# Patient Record
Sex: MALE | Race: WHITE | NOT HISPANIC OR LATINO | Employment: UNEMPLOYED | URBAN - NONMETROPOLITAN AREA
[De-identification: names, ages, dates, MRNs, and addresses within clinical notes are randomized per-mention and may not be internally consistent; named-entity substitution may affect disease eponyms.]

---

## 2024-04-19 ENCOUNTER — LAB REQUISITION (OUTPATIENT)
Dept: LAB | Facility: HOSPITAL | Age: 34
End: 2024-04-19
Payer: MEDICARE

## 2024-04-19 DIAGNOSIS — N31.9 NEUROMUSCULAR DYSFUNCTION OF BLADDER, UNSPECIFIED: ICD-10-CM

## 2024-04-19 PROCEDURE — 87077 CULTURE AEROBIC IDENTIFY: CPT

## 2024-04-19 PROCEDURE — 87086 URINE CULTURE/COLONY COUNT: CPT

## 2024-04-19 PROCEDURE — 87186 SC STD MICRODIL/AGAR DIL: CPT

## 2024-04-21 LAB — BACTERIA SPEC AEROBE CULT: ABNORMAL

## 2024-07-03 ENCOUNTER — TRANSCRIBE ORDERS (OUTPATIENT)
Dept: LAB | Facility: HOSPITAL | Age: 34
End: 2024-07-03
Payer: MEDICARE

## 2024-07-03 DIAGNOSIS — R10.9 STOMACH ACHE: Primary | ICD-10-CM

## 2024-07-03 DIAGNOSIS — R10.9 ABDOMINAL PAIN, UNSPECIFIED ABDOMINAL LOCATION: ICD-10-CM

## 2024-07-09 PROCEDURE — 87186 SC STD MICRODIL/AGAR DIL: CPT | Performed by: UROLOGY

## 2024-07-09 PROCEDURE — 87086 URINE CULTURE/COLONY COUNT: CPT | Performed by: UROLOGY

## 2024-07-09 PROCEDURE — 87077 CULTURE AEROBIC IDENTIFY: CPT | Performed by: UROLOGY

## 2024-07-11 LAB — BACTERIA SPEC AEROBE CULT: ABNORMAL

## 2024-08-06 ENCOUNTER — LAB REQUISITION (OUTPATIENT)
Dept: LAB | Facility: HOSPITAL | Age: 34
End: 2024-08-06
Payer: MEDICARE

## 2024-08-06 DIAGNOSIS — N30.00 ACUTE CYSTITIS WITHOUT HEMATURIA: ICD-10-CM

## 2024-08-06 LAB
BACTERIA UR QL AUTO: ABNORMAL /HPF
BILIRUB UR QL STRIP: NEGATIVE
CLARITY UR: CLEAR
COLOR UR: YELLOW
GLUCOSE UR STRIP-MCNC: NEGATIVE MG/DL
HGB UR QL STRIP.AUTO: ABNORMAL
HYALINE CASTS UR QL AUTO: ABNORMAL /LPF
KETONES UR QL STRIP: ABNORMAL
LEUKOCYTE ESTERASE UR QL STRIP.AUTO: ABNORMAL
NITRITE UR QL STRIP: NEGATIVE
PH UR STRIP.AUTO: 6 [PH] (ref 5–8)
PROT UR QL STRIP: ABNORMAL
RBC # UR STRIP: ABNORMAL /HPF
REF LAB TEST METHOD: ABNORMAL
SP GR UR STRIP: 1.02 (ref 1–1.03)
SQUAMOUS #/AREA URNS HPF: ABNORMAL /HPF
UROBILINOGEN UR QL STRIP: ABNORMAL
WBC # UR STRIP: ABNORMAL /HPF

## 2024-08-06 PROCEDURE — 87186 SC STD MICRODIL/AGAR DIL: CPT | Performed by: NURSE PRACTITIONER

## 2024-08-06 PROCEDURE — 87086 URINE CULTURE/COLONY COUNT: CPT | Performed by: NURSE PRACTITIONER

## 2024-08-06 PROCEDURE — 87077 CULTURE AEROBIC IDENTIFY: CPT | Performed by: NURSE PRACTITIONER

## 2024-08-06 PROCEDURE — 81001 URINALYSIS AUTO W/SCOPE: CPT | Performed by: NURSE PRACTITIONER

## 2024-08-06 PROCEDURE — 87106 FUNGI IDENTIFICATION YEAST: CPT | Performed by: NURSE PRACTITIONER

## 2024-08-08 LAB — BACTERIA SPEC AEROBE CULT: ABNORMAL

## 2024-08-15 LAB — YEAST SPEC QL CULT: NORMAL

## 2024-10-04 ENCOUNTER — TRANSCRIBE ORDERS (OUTPATIENT)
Dept: LAB | Facility: HOSPITAL | Age: 34
End: 2024-10-04
Payer: MEDICARE

## 2024-10-04 DIAGNOSIS — N39.0 RECURRENT URINARY TRACT INFECTION: Primary | ICD-10-CM

## 2024-10-06 PROCEDURE — 87077 CULTURE AEROBIC IDENTIFY: CPT | Performed by: NURSE PRACTITIONER

## 2024-10-06 PROCEDURE — 87086 URINE CULTURE/COLONY COUNT: CPT | Performed by: NURSE PRACTITIONER

## 2024-10-06 PROCEDURE — 87186 SC STD MICRODIL/AGAR DIL: CPT | Performed by: NURSE PRACTITIONER

## 2024-10-09 LAB
BACTERIA SPEC AEROBE CULT: ABNORMAL
BACTERIA SPEC AEROBE CULT: ABNORMAL

## 2024-11-20 ENCOUNTER — LAB REQUISITION (OUTPATIENT)
Dept: LAB | Facility: HOSPITAL | Age: 34
End: 2024-11-20
Payer: MEDICARE

## 2024-11-20 DIAGNOSIS — N39.0 URINARY TRACT INFECTION, SITE NOT SPECIFIED: ICD-10-CM

## 2024-11-20 PROCEDURE — 87186 SC STD MICRODIL/AGAR DIL: CPT | Performed by: UROLOGY

## 2024-11-20 PROCEDURE — 87077 CULTURE AEROBIC IDENTIFY: CPT | Performed by: UROLOGY

## 2024-11-20 PROCEDURE — 87086 URINE CULTURE/COLONY COUNT: CPT | Performed by: UROLOGY

## 2024-11-22 LAB — BACTERIA SPEC AEROBE CULT: ABNORMAL

## 2024-12-13 ENCOUNTER — OFFICE VISIT (OUTPATIENT)
Age: 34
End: 2024-12-13
Payer: MEDICARE

## 2024-12-13 VITALS
DIASTOLIC BLOOD PRESSURE: 65 MMHG | HEART RATE: 66 BPM | TEMPERATURE: 98.5 F | OXYGEN SATURATION: 97 % | SYSTOLIC BLOOD PRESSURE: 118 MMHG

## 2024-12-13 DIAGNOSIS — R53.83 OTHER FATIGUE: ICD-10-CM

## 2024-12-13 DIAGNOSIS — R10.9 ABDOMINAL PAIN, UNSPECIFIED ABDOMINAL LOCATION: Primary | ICD-10-CM

## 2024-12-13 PROBLEM — G82.50: Status: ACTIVE | Noted: 2024-12-13

## 2024-12-13 PROBLEM — N31.9 NEUROGENIC BLADDER: Status: ACTIVE | Noted: 2024-12-13

## 2024-12-13 PROBLEM — M43.22 CERVICAL VERTEBRAL FUSION: Status: ACTIVE | Noted: 2024-12-13

## 2024-12-13 PROCEDURE — 99204 OFFICE O/P NEW MOD 45 MIN: CPT | Performed by: FAMILY MEDICINE

## 2024-12-13 RX ORDER — SENNOSIDES A AND B 8.6 MG/1
17.2 TABLET, FILM COATED ORAL DAILY
COMMUNITY

## 2024-12-13 RX ORDER — LANOLIN ALCOHOL/MO/W.PET/CERES
200 CREAM (GRAM) TOPICAL DAILY
COMMUNITY

## 2024-12-13 NOTE — PROGRESS NOTES
New Patient Office Visit      Date: 2024  Patient Name: Mary Sommers  : 1990   MRN: [id number]     Chief Complaint:    Chief Complaint   Patient presents with    Establish Care       History of Present Illness: Mary Sommers is a 34 y.o. male who is here today for  establishment of care.  HAs been having problems with frequent UTIs.  Currently following with his urologist that is treating this  Wanting to look at getting a new power wheel chair.  Has been in current wheel chair since 2019. States that the current wheel chair has been starting to not work as well, parts have fallen off, parts have gotten loose, etc.  Asking about a new wheel chair.  States that he is also gaining weight so current wheel chair is getting a little bit small.  Left sided flank/abd pain.   Gastroetritis about 3 years ago.Abd pain is constant  Since that point in time.  Patient does state that his current power wheelchair is getting somewhat smaller as he has been gaining weight so leaning against the power wheelchair may be causing some of his issues.  Does however also state that he has some chronic bowel issues as well.  Does have history of what sounds to be likely sludge in his urine as well as he does do intermittent cath.    Subjective      Review of Systems:   Review of Systems   Constitutional:  Negative for appetite change and unexpected weight loss.   HENT:  Negative for trouble swallowing.    Eyes:  Negative for blurred vision and double vision.   Respiratory:  Negative for cough and shortness of breath.    Cardiovascular:  Negative for chest pain and leg swelling.   Gastrointestinal:  Negative for blood in stool.   Endocrine: Negative for cold intolerance, heat intolerance and polyuria.   Musculoskeletal:  Negative for joint swelling.   Skin:  Negative for color change and bruise.   Neurological:  Negative for numbness and memory problem.   Hematological:  Does not bruise/bleed easily.    Psychiatric/Behavioral:  Negative for suicidal ideas and depressed mood. The patient is not nervous/anxious.        Past Medical History: No past medical history on file.    Past Surgical History: No past surgical history on file.    Family History: No family history on file.    Social History:   Social History     Socioeconomic History    Marital status: Single   Tobacco Use    Smoking status: Never    Smokeless tobacco: Never   Vaping Use    Vaping status: Former       Medications:     Current Outpatient Medications:     CRANBERRY-VITAMIN C-VITAMIN E PO, Take  by mouth., Disp: , Rfl:     Magnesium Oxide -Mg Supplement 400 (240 Mg) MG tablet, Take 0.5 tablets by mouth Daily., Disp: , Rfl:     senna (SENOKOT) 8.6 MG tablet, Take 2 tablets by mouth Daily., Disp: , Rfl:     Allergies:   Allergies   Allergen Reactions    Codeine Dizziness, Other (See Comments) and Rash     vomiting   vomiting   vomiting   vomiting       Objective     Physical Exam:  Vital Signs:   Vitals:    12/13/24 1037   BP: 118/65   Pulse: 66   Temp: 98.5 °F (36.9 °C)   SpO2: 97%     There is no height or weight on file to calculate BMI.      Physical Exam  Vitals and nursing note reviewed.   Constitutional:       Comments: Patient is tetraplegic.  Currently using power wheelchair for mobilization.   HENT:      Head: Normocephalic and atraumatic.   Eyes:      General: Lids are normal.      Conjunctiva/sclera: Conjunctivae normal.   Cardiovascular:      Rate and Rhythm: Normal rate and regular rhythm.   Pulmonary:      Effort: Pulmonary effort is normal.      Breath sounds: Normal breath sounds and air entry.   Abdominal:      General: Abdomen is flat. Bowel sounds are normal.      Palpations: Abdomen is soft.   Musculoskeletal:      Cervical back: Full passive range of motion without pain and normal range of motion.   Neurological:      General: No focal deficit present.      Mental Status: He is alert and oriented to person, place, and time.    Psychiatric:         Attention and Perception: Attention normal.         Mood and Affect: Mood normal.         Behavior: Behavior normal. Behavior is cooperative.         POCT Results (if applicable):   Results for orders placed or performed in visit on 11/20/24   Urine Culture - Urine, Urine, Clean Catch    Collection Time: 11/20/24 11:00 AM    Specimen: Urine, Clean Catch   Result Value Ref Range    Urine Culture >100,000 CFU/mL Escherichia coli (A)        Susceptibility    Escherichia coli - QUAN     Amoxicillin + Clavulanate  Susceptible ug/ml     Ampicillin  Resistant ug/ml     Ampicillin + Sulbactam  Intermediate ug/ml     Cefazolin (Urine)  Susceptible ug/ml     Cefepime  Susceptible ug/ml     Ceftazidime  Susceptible ug/ml     Ceftriaxone  Susceptible ug/ml     Gentamicin  Susceptible ug/ml     Levofloxacin  Resistant ug/ml     Nitrofurantoin  Susceptible ug/ml     Piperacillin + Tazobactam  Susceptible ug/ml     Trimethoprim + Sulfamethoxazole  Resistant ug/ml            Assessment / Plan      Assessment/Plan:   Diagnoses and all orders for this visit:    1. Abdominal pain, unspecified abdominal location (Primary)  -     CBC (No Diff); Future  -     Comprehensive metabolic panel; Future  -     Hepatitis C antibody; Future  -     CT Abdomen Pelvis With & Without Contrast; Future    2. Other fatigue  -     Testosterone, Free, Total; Future  -     TSH; Future       BMI cannot be calculated due to outdated height or weight values.  Please input a current height/weight in Vitals and re-renter BMIFOLLOWUP in Note to pull in correct documentation based on BMI range.      Follow Up:   Return in about 3 months (around 3/13/2025) for Medicare Wellness.    Julien Castillo,    [facility]

## 2025-01-28 ENCOUNTER — LAB REQUISITION (OUTPATIENT)
Dept: LAB | Facility: HOSPITAL | Age: 35
End: 2025-01-28
Payer: MEDICARE

## 2025-01-28 DIAGNOSIS — N39.0 URINARY TRACT INFECTION, SITE NOT SPECIFIED: ICD-10-CM

## 2025-01-28 PROCEDURE — 87077 CULTURE AEROBIC IDENTIFY: CPT | Performed by: UROLOGY

## 2025-01-28 PROCEDURE — 87186 SC STD MICRODIL/AGAR DIL: CPT | Performed by: UROLOGY

## 2025-01-28 PROCEDURE — 87086 URINE CULTURE/COLONY COUNT: CPT | Performed by: UROLOGY

## 2025-01-30 LAB — BACTERIA SPEC AEROBE CULT: ABNORMAL

## 2025-02-28 ENCOUNTER — TRANSCRIBE ORDERS (OUTPATIENT)
Dept: LAB | Facility: HOSPITAL | Age: 35
End: 2025-02-28
Payer: MEDICARE

## 2025-02-28 DIAGNOSIS — N39.0 RECURRENT URINARY TRACT INFECTION: Primary | ICD-10-CM

## 2025-03-03 ENCOUNTER — LAB REQUISITION (OUTPATIENT)
Dept: LAB | Facility: HOSPITAL | Age: 35
End: 2025-03-03
Payer: MEDICARE

## 2025-03-03 DIAGNOSIS — N39.0 URINARY TRACT INFECTION, SITE NOT SPECIFIED: ICD-10-CM

## 2025-03-03 PROCEDURE — 87186 SC STD MICRODIL/AGAR DIL: CPT | Performed by: UROLOGY

## 2025-03-03 PROCEDURE — 87086 URINE CULTURE/COLONY COUNT: CPT | Performed by: UROLOGY

## 2025-03-03 PROCEDURE — 87077 CULTURE AEROBIC IDENTIFY: CPT | Performed by: UROLOGY

## 2025-03-06 LAB — BACTERIA SPEC AEROBE CULT: ABNORMAL

## 2025-04-04 ENCOUNTER — TRANSCRIBE ORDERS (OUTPATIENT)
Dept: LAB | Facility: HOSPITAL | Age: 35
End: 2025-04-04
Payer: MEDICARE

## 2025-04-04 ENCOUNTER — LAB (OUTPATIENT)
Dept: LAB | Facility: HOSPITAL | Age: 35
End: 2025-04-04
Payer: MEDICARE

## 2025-04-04 DIAGNOSIS — N39.0 RECURRENT UTI: Primary | ICD-10-CM

## 2025-04-04 DIAGNOSIS — N39.0 RECURRENT UTI: ICD-10-CM

## 2025-04-04 PROCEDURE — 87086 URINE CULTURE/COLONY COUNT: CPT

## 2025-04-04 PROCEDURE — 87186 SC STD MICRODIL/AGAR DIL: CPT

## 2025-04-04 PROCEDURE — 87077 CULTURE AEROBIC IDENTIFY: CPT

## 2025-04-06 LAB — BACTERIA SPEC AEROBE CULT: ABNORMAL

## 2025-04-08 ENCOUNTER — TELEPHONE (OUTPATIENT)
Age: 35
End: 2025-04-08
Payer: MEDICARE

## 2025-04-08 NOTE — TELEPHONE ENCOUNTER
Caller: [employer] [facility]    Relationship:     Best call back number: [phone]     What form or medical record are you requesting: OFFICE NOTES REGARDING POWER WHEELCHAIR    Who is requesting this form or medical record from you: [employer]    How would you like to receive the form or medical records (pick-up, mail, fax): FAX  If fax, what is the fax number: [phone]   If mail, what is the address:   If pick-up, provide patient with address and location details    Timeframe paperwork needed:     Additional notes:

## 2025-04-25 ENCOUNTER — REFERRAL TRIAGE (OUTPATIENT)
Dept: CASE MANAGEMENT | Facility: OTHER | Age: 35
End: 2025-04-25
Payer: MEDICARE

## 2025-04-25 ENCOUNTER — TRANSCRIBE ORDERS (OUTPATIENT)
Dept: LAB | Facility: HOSPITAL | Age: 35
End: 2025-04-25
Payer: MEDICARE

## 2025-04-25 ENCOUNTER — OFFICE VISIT (OUTPATIENT)
Age: 35
End: 2025-04-25
Payer: MEDICARE

## 2025-04-25 VITALS
OXYGEN SATURATION: 98 % | SYSTOLIC BLOOD PRESSURE: 93 MMHG | HEART RATE: 54 BPM | DIASTOLIC BLOOD PRESSURE: 70 MMHG | TEMPERATURE: 97.6 F

## 2025-04-25 DIAGNOSIS — E55.9 VITAMIN D DEFICIENCY: ICD-10-CM

## 2025-04-25 DIAGNOSIS — G82.50 TETRAPLEGIC: Primary | ICD-10-CM

## 2025-04-25 DIAGNOSIS — R53.83 OTHER FATIGUE: ICD-10-CM

## 2025-04-25 DIAGNOSIS — N31.9 NEUROGENIC BLADDER: ICD-10-CM

## 2025-04-25 DIAGNOSIS — R10.9 ABDOMINAL PAIN, UNSPECIFIED ABDOMINAL LOCATION: ICD-10-CM

## 2025-04-25 DIAGNOSIS — N39.0 URINARY TRACT INFECTION WITHOUT HEMATURIA, SITE UNSPECIFIED: Primary | ICD-10-CM

## 2025-04-25 LAB
25(OH)D3 SERPL-MCNC: 17.5 NG/ML (ref 30–100)
ALBUMIN SERPL-MCNC: 4.5 G/DL (ref 3.5–5.2)
ALBUMIN/GLOB SERPL: 1.5 G/DL
ALP SERPL-CCNC: 66 U/L (ref 39–117)
ALT SERPL W P-5'-P-CCNC: 69 U/L (ref 1–41)
ANION GAP SERPL CALCULATED.3IONS-SCNC: 10.3 MMOL/L (ref 5–15)
AST SERPL-CCNC: 34 U/L (ref 1–40)
BILIRUB SERPL-MCNC: 0.4 MG/DL (ref 0–1.2)
BUN SERPL-MCNC: 13 MG/DL (ref 6–20)
BUN/CREAT SERPL: 19.4 (ref 7–25)
CALCIUM SPEC-SCNC: 9.4 MG/DL (ref 8.6–10.5)
CHLORIDE SERPL-SCNC: 106 MMOL/L (ref 98–107)
CO2 SERPL-SCNC: 22.7 MMOL/L (ref 22–29)
CREAT SERPL-MCNC: 0.67 MG/DL (ref 0.76–1.27)
DEPRECATED RDW RBC AUTO: 40.8 FL (ref 37–54)
EGFRCR SERPLBLD CKD-EPI 2021: 126.4 ML/MIN/1.73
ERYTHROCYTE [DISTWIDTH] IN BLOOD BY AUTOMATED COUNT: 12.6 % (ref 12.3–15.4)
GLOBULIN UR ELPH-MCNC: 3 GM/DL
GLUCOSE SERPL-MCNC: 84 MG/DL (ref 65–99)
HCT VFR BLD AUTO: 42.6 % (ref 37.5–51)
HCV AB SER QL: NORMAL
HGB BLD-MCNC: 14.4 G/DL (ref 13–17.7)
MCH RBC QN AUTO: 29.9 PG (ref 26.6–33)
MCHC RBC AUTO-ENTMCNC: 33.8 G/DL (ref 31.5–35.7)
MCV RBC AUTO: 88.6 FL (ref 79–97)
PLATELET # BLD AUTO: 282 10*3/MM3 (ref 140–450)
PMV BLD AUTO: 10 FL (ref 6–12)
POTASSIUM SERPL-SCNC: 4 MMOL/L (ref 3.5–5.2)
PROT SERPL-MCNC: 7.5 G/DL (ref 6–8.5)
RBC # BLD AUTO: 4.81 10*6/MM3 (ref 4.14–5.8)
SODIUM SERPL-SCNC: 139 MMOL/L (ref 136–145)
WBC NRBC COR # BLD AUTO: 5.76 10*3/MM3 (ref 3.4–10.8)

## 2025-04-25 PROCEDURE — 82306 VITAMIN D 25 HYDROXY: CPT | Performed by: FAMILY MEDICINE

## 2025-04-25 PROCEDURE — 84402 ASSAY OF FREE TESTOSTERONE: CPT | Performed by: FAMILY MEDICINE

## 2025-04-25 PROCEDURE — 84443 ASSAY THYROID STIM HORMONE: CPT | Performed by: FAMILY MEDICINE

## 2025-04-25 PROCEDURE — 86803 HEPATITIS C AB TEST: CPT | Performed by: FAMILY MEDICINE

## 2025-04-25 PROCEDURE — 80053 COMPREHEN METABOLIC PANEL: CPT | Performed by: FAMILY MEDICINE

## 2025-04-25 PROCEDURE — 84403 ASSAY OF TOTAL TESTOSTERONE: CPT | Performed by: FAMILY MEDICINE

## 2025-04-25 PROCEDURE — 85027 COMPLETE CBC AUTOMATED: CPT | Performed by: FAMILY MEDICINE

## 2025-04-25 RX ORDER — GENTAMICIN 40 MG/ML
INJECTION, SOLUTION INTRAMUSCULAR; INTRAVENOUS
COMMUNITY
Start: 2025-04-24

## 2025-04-25 NOTE — PROGRESS NOTES
Follow Up Office Visit      Date: 2025   Patient Name: Mary Sommers  : 1990   MRN: [id number]     Chief Complaint:    Chief Complaint   Patient presents with    Abdominal Pain     Follow up - same as previous visit         History of Present Illness: Mary Sommers is a 34 y.o. male who is here today for follow up.    Patient does state that he has difficulty with getting out of bed as well as showering.  States that they need help with ADLs, which his mother has been helping with but she is starting to have her own medical issues which is starting to hinder this.    Subjective      Review of Systems:   Review of Systems   Constitutional:  Negative for appetite change and unexpected weight loss.   HENT:  Negative for trouble swallowing.    Eyes:  Negative for blurred vision and double vision.   Respiratory:  Negative for cough and shortness of breath.    Cardiovascular:  Negative for chest pain and leg swelling.   Gastrointestinal:  Negative for blood in stool.   Endocrine: Negative for cold intolerance, heat intolerance and polyuria.   Musculoskeletal:  Negative for joint swelling.   Skin:  Negative for color change and bruise.   Neurological:  Negative for numbness and memory problem.   Hematological:  Does not bruise/bleed easily.   Psychiatric/Behavioral:  Negative for suicidal ideas and depressed mood. The patient is not nervous/anxious.        I have reviewed the patients family history, social history, past medical history, past surgical history and have updated it as appropriate.     Medications:     Current Outpatient Medications:     gentamicin (GARAMYCIN) 40 MG/ML injection, , Disp: , Rfl:     senna (SENOKOT) 8.6 MG tablet, Take 2 tablets by mouth Daily., Disp: , Rfl:     CRANBERRY-VITAMIN C-VITAMIN E PO, Take  by mouth. (Patient not taking: Reported on 2025), Disp: , Rfl:     Magnesium Oxide -Mg Supplement 400 (240 Mg) MG tablet, Take 0.5 tablets by mouth Daily.  "(Patient not taking: Reported on 4/25/2025), Disp: , Rfl:     Allergies:   Allergies   Allergen Reactions    Codeine Rash, Other (See Comments) and Dizziness     vomiting       Objective     Physical Exam: Please see above  Vital Signs:   Vitals:    04/25/25 1028   BP: 93/70   Pulse: 54   Temp: 97.6 °F (36.4 °C)   TempSrc: Temporal   SpO2: 98%   Weight: Comment: unobtainable - wheelchair bound   Height: Comment: unobtainable - wheelchair bound     There is no height or weight on file to calculate BMI.    Physical Exam  Vitals and nursing note reviewed.   Constitutional:       Appearance: Normal appearance.   HENT:      Head: Normocephalic and atraumatic.   Eyes:      General: Lids are normal.      Conjunctiva/sclera: Conjunctivae normal.   Cardiovascular:      Rate and Rhythm: Normal rate and regular rhythm.   Pulmonary:      Effort: Pulmonary effort is normal.      Breath sounds: Normal breath sounds and air entry.   Abdominal:      General: Abdomen is flat. Bowel sounds are normal.      Palpations: Abdomen is soft.   Musculoskeletal:      Cervical back: Full passive range of motion without pain and normal range of motion.   Neurological:      General: No focal deficit present.      Mental Status: He is alert and oriented to person, place, and time.   Psychiatric:         Attention and Perception: Attention normal.         Mood and Affect: Mood normal.         Behavior: Behavior normal. Behavior is cooperative.         Procedures    Results:   Labs:   No results found for: \"HGBA1C\", \"CMP\", \"CBCDIFFPANEL\", \"CREAT\", \"TSH\"     POCT Results (if applicable):   Results for orders placed or performed in visit on 04/04/25   Urine Culture - Urine, Urine, Clean Catch    Collection Time: 04/04/25  5:06 PM    Specimen: Urine, Clean Catch   Result Value Ref Range    Urine Culture >100,000 CFU/mL Escherichia coli (A)        Susceptibility    Escherichia coli - QUAN     Amoxicillin + Clavulanate  Susceptible ug/ml     Ampicillin  " Resistant ug/ml     Ampicillin + Sulbactam  Intermediate ug/ml     Cefazolin (Urine)  Susceptible ug/ml     Cefepime  Susceptible ug/ml     Ceftazidime  Susceptible ug/ml     Ceftriaxone  Susceptible ug/ml     Ciprofloxacin  Resistant ug/ml     Gentamicin  Susceptible ug/ml     Levofloxacin  Resistant ug/ml     Nitrofurantoin  Susceptible ug/ml     Piperacillin + Tazobactam  Susceptible ug/ml     Trimethoprim + Sulfamethoxazole  Resistant ug/ml       Imaging:   No valid procedures specified.         Assessment / Plan      Assessment/Plan:   Diagnoses and all orders for this visit:    1. Tetraplegic (Primary)  -     Ambulatory Referral to Case Management Care Coordination    2. Vitamin D deficiency  -     Vitamin D 25 hydroxy; Future  -     Vitamin D 25 hydroxy    3. Abdominal pain, unspecified abdominal location  -     CBC (No Diff)  -     Comprehensive metabolic panel  -     Hepatitis C antibody    4. Other fatigue  -     Testosterone, Free, Total  -     TSH        BMI cannot be calculated due to outdated height or weight values.  Please input a current height/weight in Vitals and re-renter BMIFOLLOWUP in Note to pull in correct documentation based on BMI range.      Vaccine Counseling:      Follow Up:   Return in about 6 months (around 10/25/2025) for Medicare Wellness.        Julien Castillo, DO  [facility]

## 2025-04-26 ENCOUNTER — LAB (OUTPATIENT)
Dept: LAB | Facility: HOSPITAL | Age: 35
End: 2025-04-26
Payer: MEDICARE

## 2025-04-26 DIAGNOSIS — N31.9 NEUROGENIC BLADDER: ICD-10-CM

## 2025-04-26 DIAGNOSIS — N39.0 URINARY TRACT INFECTION WITHOUT HEMATURIA, SITE UNSPECIFIED: ICD-10-CM

## 2025-04-26 DIAGNOSIS — N39.0 RECURRENT URINARY TRACT INFECTION: ICD-10-CM

## 2025-04-26 LAB — TSH SERPL DL<=0.05 MIU/L-ACNC: 2.47 UIU/ML (ref 0.27–4.2)

## 2025-04-26 PROCEDURE — 87186 SC STD MICRODIL/AGAR DIL: CPT

## 2025-04-26 PROCEDURE — 87077 CULTURE AEROBIC IDENTIFY: CPT

## 2025-04-26 PROCEDURE — 87086 URINE CULTURE/COLONY COUNT: CPT

## 2025-04-28 LAB — BACTERIA SPEC AEROBE CULT: ABNORMAL

## 2025-04-29 ENCOUNTER — PATIENT OUTREACH (OUTPATIENT)
Dept: CASE MANAGEMENT | Facility: OTHER | Age: 35
End: 2025-04-29
Payer: MEDICARE

## 2025-04-29 NOTE — OUTREACH NOTE
AMBULATORY CASE MANAGEMENT NOTE    Names and Relationships of Patient/Support Persons: Contact: Mary Sommers Haim; Relationship: Self -     Patient Outreach    RN-ACM outreach to patient per provider referral for in-home care needs. Patient has tetraplegia and is wheelchair bound; pt has been living with his mother but states his mother is experiencing some health issues of her own and he is needing someone to come in for 3-4 hrs a day to help with his personal care. ACM discussed the waiver program and provided the phone number for this ([phone]); patient states he does not think he will qualify for this due to his income being too high. Patient advises that his insurance plan through [employer] mentions covering around 30 hrs per week for in-home care, however, all covered agencies listed were for Home Health rather than long term personal care. ACM discussed the [employer] program as another option if patient is eligible despite age. ACM provided education/information around covered HH services and personal care; discussed options of paying out of pocket if no other option is found. ACM will send referral to Ambulatory SW for additional assistance. Interim outreach scheduled to follow up with patient.     Adult Patient Profile  Questions/Answers      Flowsheet Row Most Recent Value   Symptoms/Conditions Managed at Home musculoskeletal   Barriers to Managing Health other (see comments)  [pt's mother is becoming unable to care for pt's physical needs]   Musculoskeletal Symptoms/Conditions mobility limited, other (see comments)  [tetraplegia]   Musculoskeletal Management Strategies coping strategies   Musculoskeletal Self-Management Outcome well   Musculoskeletal Comment Pt requires assistance with bathing, toileting,  cg will not be able to assist much longer   Identifying Health Goals keep illness under control  [in home care]   Taking Medications Not Prescribed no   Missed Doses of Prescribed  Medications During Past Week no   Taken Prescribed Medications at Different Time or Schedule During Past Week no   Taken More or Less Medication Than Prescribed no   Barriers to Taking Medication as Prescribed none            Education Documentation  Unresolved/Worsening Symptoms, taught by Jossy Polanco, RN at 4/29/2025 12:39 PM.  Learner: Patient  Readiness: Acceptance  Method: Explanation  Response: Verbalizes Understanding    Safety, taught by Jossy Polanco, RN at 4/29/2025 12:39 PM.  Learner: Patient  Readiness: Acceptance  Method: Explanation  Response: Verbalizes Understanding    Home Safety, taught by Jossy Polanco, RN at 4/29/2025 12:39 PM.  Learner: Patient  Readiness: Acceptance  Method: Explanation  Response: Verbalizes Understanding    Risk Factors, taught by Jossy Polanco, RN at 4/29/2025 12:39 PM.  Learner: Patient  Readiness: Acceptance  Method: Explanation  Response: Verbalizes Understanding          Jossy CACERES  Ambulatory Case Management    4/29/2025, 12:52 EDT

## 2025-04-30 LAB
TESTOST FREE SERPL-MCNC: 5.2 PG/ML (ref 8.7–25.1)
TESTOST SERPL-MCNC: 243 NG/DL (ref 264–916)

## 2025-05-01 ENCOUNTER — TELEPHONE (OUTPATIENT)
Age: 35
End: 2025-05-01
Payer: MEDICARE

## 2025-05-01 ENCOUNTER — PATIENT OUTREACH (OUTPATIENT)
Age: 35
End: 2025-05-01
Payer: MEDICARE

## 2025-05-01 NOTE — TELEPHONE ENCOUNTER
[employer] is going to refax the paperwork due to needing a signature and date.

## 2025-05-01 NOTE — OUTREACH NOTE
SW attempted contact with [facility]. SW will attempt again in a couple of business days.     Edilma GUILLORY -   Ambulatory Case Management    5/1/2025, 11:28 EDT

## 2025-05-06 ENCOUNTER — PATIENT OUTREACH (OUTPATIENT)
Age: 35
End: 2025-05-06
Payer: MEDICARE

## 2025-05-06 NOTE — OUTREACH NOTE
SW attempted to contact pt a second time, and scheduled a third call for one week out. SW left a voicemail requesting a return call. SW will attempt again in a week.     Edilma GUILLORY -   Ambulatory Case Management    5/6/2025, 10:27 EDT

## 2025-05-13 ENCOUNTER — PATIENT OUTREACH (OUTPATIENT)
Age: 35
End: 2025-05-13
Payer: MEDICARE

## 2025-05-13 NOTE — OUTREACH NOTE
UTR[facility]. SW has attempted to contact pt with [facility]. SW will D/c pt due to UTR, if no response within a week. Pt may contact SW and received services, should they be needed in the future.     Edilma GUILLORY -   Ambulatory Case Management    5/13/2025, 09:44 EDT

## 2025-05-20 ENCOUNTER — PATIENT OUTREACH (OUTPATIENT)
Age: 35
End: 2025-05-20
Payer: MEDICARE

## 2025-05-20 NOTE — OUTREACH NOTE
SW to D/c due to [facility].     Edilma GUILLORY -   Ambulatory Case Management    5/20/2025, 09:04 EDT

## 2025-05-24 ENCOUNTER — TRANSCRIBE ORDERS (OUTPATIENT)
Dept: LAB | Facility: HOSPITAL | Age: 35
End: 2025-05-24
Payer: MEDICARE

## 2025-05-24 ENCOUNTER — LAB (OUTPATIENT)
Dept: LAB | Facility: HOSPITAL | Age: 35
End: 2025-05-24
Payer: MEDICARE

## 2025-05-24 DIAGNOSIS — N39.0 RECURRENT UTI: Primary | ICD-10-CM

## 2025-05-24 DIAGNOSIS — N39.0 RECURRENT UTI: ICD-10-CM

## 2025-05-24 PROCEDURE — 87077 CULTURE AEROBIC IDENTIFY: CPT

## 2025-05-24 PROCEDURE — 87086 URINE CULTURE/COLONY COUNT: CPT

## 2025-05-24 PROCEDURE — 87186 SC STD MICRODIL/AGAR DIL: CPT

## 2025-05-26 LAB — BACTERIA SPEC AEROBE CULT: ABNORMAL

## 2025-06-14 ENCOUNTER — LAB REQUISITION (OUTPATIENT)
Dept: LAB | Facility: HOSPITAL | Age: 35
End: 2025-06-14
Payer: MEDICARE

## 2025-06-14 DIAGNOSIS — N39.0 URINARY TRACT INFECTION, SITE NOT SPECIFIED: ICD-10-CM

## 2025-06-14 PROCEDURE — 87086 URINE CULTURE/COLONY COUNT: CPT | Performed by: NURSE PRACTITIONER

## 2025-06-14 PROCEDURE — 87077 CULTURE AEROBIC IDENTIFY: CPT | Performed by: NURSE PRACTITIONER

## 2025-06-14 PROCEDURE — 87186 SC STD MICRODIL/AGAR DIL: CPT | Performed by: NURSE PRACTITIONER

## 2025-06-16 LAB — BACTERIA SPEC AEROBE CULT: ABNORMAL

## 2025-07-01 ENCOUNTER — TRANSCRIBE ORDERS (OUTPATIENT)
Dept: LAB | Facility: HOSPITAL | Age: 35
End: 2025-07-01
Payer: MEDICARE

## 2025-07-01 ENCOUNTER — LAB (OUTPATIENT)
Dept: LAB | Facility: HOSPITAL | Age: 35
End: 2025-07-01
Payer: MEDICARE

## 2025-07-01 DIAGNOSIS — N39.0 RECURRENT UTI: Primary | ICD-10-CM

## 2025-07-01 DIAGNOSIS — N39.0 RECURRENT UTI: ICD-10-CM

## 2025-07-01 PROCEDURE — 87077 CULTURE AEROBIC IDENTIFY: CPT

## 2025-07-01 PROCEDURE — 87186 SC STD MICRODIL/AGAR DIL: CPT

## 2025-07-01 PROCEDURE — 87086 URINE CULTURE/COLONY COUNT: CPT

## 2025-07-03 LAB — BACTERIA SPEC AEROBE CULT: ABNORMAL

## 2025-07-22 ENCOUNTER — TRANSCRIBE ORDERS (OUTPATIENT)
Dept: LAB | Facility: HOSPITAL | Age: 35
End: 2025-07-22
Payer: MEDICARE

## 2025-07-22 ENCOUNTER — LAB (OUTPATIENT)
Dept: LAB | Facility: HOSPITAL | Age: 35
End: 2025-07-22
Payer: MEDICARE

## 2025-07-22 DIAGNOSIS — N39.0 RECURRENT UTI: ICD-10-CM

## 2025-07-22 DIAGNOSIS — N39.0 RECURRENT UTI: Primary | ICD-10-CM

## 2025-07-22 PROCEDURE — 87077 CULTURE AEROBIC IDENTIFY: CPT

## 2025-07-22 PROCEDURE — 87186 SC STD MICRODIL/AGAR DIL: CPT

## 2025-07-22 PROCEDURE — 87086 URINE CULTURE/COLONY COUNT: CPT

## 2025-07-24 LAB — BACTERIA SPEC AEROBE CULT: ABNORMAL
